# Patient Record
Sex: FEMALE | Race: OTHER | NOT HISPANIC OR LATINO | URBAN - METROPOLITAN AREA
[De-identification: names, ages, dates, MRNs, and addresses within clinical notes are randomized per-mention and may not be internally consistent; named-entity substitution may affect disease eponyms.]

---

## 2020-01-25 ENCOUNTER — EMERGENCY (EMERGENCY)
Facility: HOSPITAL | Age: 40
LOS: 1 days | Discharge: HOME | End: 2020-01-25
Admitting: EMERGENCY MEDICINE
Payer: COMMERCIAL

## 2020-01-25 VITALS
HEART RATE: 100 BPM | SYSTOLIC BLOOD PRESSURE: 142 MMHG | RESPIRATION RATE: 18 BRPM | OXYGEN SATURATION: 96 % | TEMPERATURE: 98 F | DIASTOLIC BLOOD PRESSURE: 82 MMHG | HEIGHT: 62 IN | WEIGHT: 160.06 LBS

## 2020-01-25 PROCEDURE — 99283 EMERGENCY DEPT VISIT LOW MDM: CPT

## 2020-01-25 RX ORDER — DIPHENHYDRAMINE HCL 50 MG
25 CAPSULE ORAL ONCE
Refills: 0 | Status: COMPLETED | OUTPATIENT
Start: 2020-01-25 | End: 2020-01-25

## 2020-01-25 RX ORDER — DIPHENHYDRAMINE HCL 50 MG
1 CAPSULE ORAL
Qty: 15 | Refills: 0
Start: 2020-01-25 | End: 2020-01-29

## 2020-01-25 RX ADMIN — Medication 50 MILLIGRAM(S): at 03:41

## 2020-01-25 RX ADMIN — Medication 25 MILLIGRAM(S): at 03:40

## 2020-01-25 NOTE — ED PROVIDER NOTE - OBJECTIVE STATEMENT
38 yo female in the ER c/o itchy rash on her lower legs, arms, back, shoulders. Pt reports that she noted it at first 2 days ago. Now seems like rash is spreading and she feels itchy even all over her face, also c/o scratchy sensation in her throat. Denies difficulty swallowing, denies SOB, cold or flu symptoms, recent travel, new meds  or new food intake.

## 2020-01-25 NOTE — ED ADULT TRIAGE NOTE - CHIEF COMPLAINT QUOTE
rashes started on her lower extremities, then upper extremities and face with itchiness for couple of days,   sore throat this morning

## 2020-01-25 NOTE — ED PROVIDER NOTE - CHPI ED SYMPTOMS NEG
no difficulty breathing/no difficulty swallowing/no vomiting/no shortness of breath/no swelling of face, tongue

## 2020-01-25 NOTE — ED PROVIDER NOTE - PATIENT PORTAL LINK FT
You can access the FollowMyHealth Patient Portal offered by [facility] by registering at the following website: [email or web address]. By joining [employer]’s FollowMyHealth portal, you will also be able to view your health information using other applications (apps) compatible with our system.

## 2020-01-25 NOTE — ED PROVIDER NOTE - SKIN, MLM
Skin normal color for race, warm, dry and intact.  diffuse urticarial rash on lower and upper extremities. no facial rash

## 2020-01-25 NOTE — ED PROVIDER NOTE - NSFOLLOWUPINSTRUCTIONS_ED_ALL_ED_FT
I have discussed the discharge plan with the patient. The patient agrees with the plan, as discussed.  The patient understands Emergency Department diagnosis is a preliminary diagnosis often based on limited information and that the patient must adhere to the follow-up plan as discussed.  The patient understands that if the symptoms worsen or if prescribed medications do not have the desired/planned effect that the patient may return to the Emergency Department at any time for further evaluation and treatment.    URTICARIA - General Information           Urticaria    WHAT YOU NEED TO KNOW:    What is urticaria? Urticaria is also called hives. Hives can change size and shape, and appear anywhere on your skin. They can be mild or severe and last from a few minutes to a few days. Hives may be a sign of a severe allergic reaction called anaphylaxis that needs immediate treatment. Urticaria that lasts longer than 6 weeks may be a chronic condition that needs long-term treatment.     What causes urticaria? Hives are caused by an immune system reaction. The following are common triggers:   •Food allergies, such as to nuts, eggs, or shellfish      •Food dyes, additives, or preservatives      •Medicine allergies, such as to ibuprofen or antibiotics      •Infections, such as a cold or mono      •Bug bites      •Pets, plants, or latex      •Stress      How is the cause of urticaria diagnosed? Your healthcare provider will examine you and ask about your symptoms. He may also ask about your family medical history, medicines you take, and foods you eat. Tell your healthcare provider about any recent trauma, stress, or contact with allergens. You may need additional testing if you developed anaphylaxis after you were exposed to a trigger and then exercised. This is called exercise-induced anaphylaxis. You may need any of the following:   •A skin test is used to see how your skin reacts to possible triggers. Your healthcare provider will put a small amount of the trigger onto your skin. He will cover the area with a patch that stays on for 2 days. Then he will check your skin for a reaction.      •A challenge test is used to give you increasing doses of what may be causing your hives. Your healthcare provider will watch for a reaction.      How is urticaria treated? Hives often go away without treatment. Chronic urticaria may need to be treated with more than one medicine, or other medicines than listed below. The following are common medicines used to treat urticaria:   •Antihistamines decrease mild symptoms such as itching or a rash.      •Steroids decrease redness, pain, and swelling.      •Epinephrine is used to treat severe allergic reactions such as anaphylaxis.       What steps do I need to take for signs or symptoms of anaphylaxis?   •Immediately give 1 shot of epinephrine only into the outer thigh muscle.       •Leave the shot in place as directed. Your healthcare provider may recommend you leave it in place for up to 10 seconds before you remove it. This helps make sure all of the epinephrine is delivered.       •Call 911 and go to the emergency department, even if the shot improved symptoms. Do not drive yourself. Bring the used epinephrine shot with you.       What safety precautions do I need to take if I am at risk for anaphylaxis?   •Keep 2 shots of epinephrine with you at all times. You may need a second shot, because epinephrine only works for about 20 minutes and symptoms may return. Your healthcare provider can show you and family members how to give the shot. Check the expiration date every month and replace it before it expires.      •Create an action plan. Your healthcare provider can help you create a written plan that explains the allergy and an emergency plan to treat a reaction. The plan explains when to give a second epinephrine shot if symptoms return or do not improve after the first. Give copies of the action plan and emergency instructions to family members, work and school staff, and  providers. Show them how to give a shot of epinephrine.      •Be careful when you exercise. If you have had exercise-induced anaphylaxis, do not exercise right after you eat. Stop exercising right away if you start to develop any signs or symptoms of anaphylaxis. You may first feel tired, warm, or have itchy skin. Hives, swelling, and severe breathing problems may develop if you continue to exercise.      •Carry medical alert identification. Wear medical alert jewelry or carry a card that explains the allergy. Ask your healthcare provider where to get these items.   Medical Alert Jewelry           •Keep a record of triggers and symptoms. Record everything you eat, drink, or apply to your skin for 3 weeks. Include stressful events and what you were doing right before your hives started. Bring the record with you to follow-up visits with your healthcare provider.      What can I do to manage urticaria?   •Cool your skin. This may help decrease itching. Apply a cool pack to your hives. Dip a hand towel in cool water, wring it out, and place it on your hives. You may also soak your skin in a cool oatmeal bath.      •Do not rub your hives. This can irritate your skin and cause more hives.      •Wear loose clothing. Tight clothes may irritate your skin and cause more hives.      •Manage stress. Stress may trigger hives, or make them worse. Learn new ways to relax, such as deep breathing.       Call 911 for signs or symptoms of anaphylaxis, such as trouble breathing, swelling in your mouth or throat, or wheezing. You may also have itching, a rash, or feel like you are going to faint.    When should I seek immediate care?   •Your heart is beating faster than it normally does.      •You have cramping or severe pain in your abdomen.      When should I contact my healthcare provider?   •You have a fever.      •Your skin still itches 24 hours after you take your medicine.      •You still have hives after 7 days.      •Your joints are painful and swollen.      •You have questions or concerns about your condition or care.

## 2020-01-25 NOTE — ED PROVIDER NOTE - CLINICAL SUMMARY MEDICAL DECISION MAKING FREE TEXT BOX
38 yo female in the ER c/o itchy rash on her lower legs, arms, back, shoulders. Pt reports that she noted it at first 2 days ago. Now seems like rash is spreading and she feels itchy even all over her face, also c/o scratchy sensation in her throat. Denies difficulty swallowing, denies SOB, cold or flu symptoms, recent travel, new meds  or new food intake. on exam- well appearing female with diffuse urticarial rash, no clinical findings to suspect any infectious etiology. plan : d/c home with prednisone  and Benadryl PO

## 2020-01-25 NOTE — ED ADULT NURSE NOTE - OBJECTIVE STATEMENT
Patient to the Ed with complaint of rash to extremities legs hands and face for  a couple days described as raised, itchy, and sore throat beginning an hour ago described as scratchy, no difficulty breathing.  patient reported that the only new medication taking is OTC collagen which she has taken before with no side effects.

## 2020-01-31 DIAGNOSIS — L50.9 URTICARIA, UNSPECIFIED: ICD-10-CM

## 2020-01-31 DIAGNOSIS — R21 RASH AND OTHER NONSPECIFIC SKIN ERUPTION: ICD-10-CM

## 2021-06-21 ENCOUNTER — EMERGENCY (EMERGENCY)
Facility: HOSPITAL | Age: 41
LOS: 1 days | Discharge: HOME | End: 2021-06-21
Attending: EMERGENCY MEDICINE | Admitting: EMERGENCY MEDICINE
Payer: COMMERCIAL

## 2021-06-21 VITALS
OXYGEN SATURATION: 98 % | RESPIRATION RATE: 18 BRPM | SYSTOLIC BLOOD PRESSURE: 138 MMHG | TEMPERATURE: 98 F | DIASTOLIC BLOOD PRESSURE: 87 MMHG | HEART RATE: 78 BPM

## 2021-06-21 VITALS
HEART RATE: 83 BPM | RESPIRATION RATE: 18 BRPM | HEIGHT: 62 IN | TEMPERATURE: 98 F | DIASTOLIC BLOOD PRESSURE: 74 MMHG | OXYGEN SATURATION: 99 % | WEIGHT: 149.91 LBS | SYSTOLIC BLOOD PRESSURE: 120 MMHG

## 2021-06-21 DIAGNOSIS — R10.13 EPIGASTRIC PAIN: ICD-10-CM

## 2021-06-21 DIAGNOSIS — Z88.5 ALLERGY STATUS TO NARCOTIC AGENT: ICD-10-CM

## 2021-06-21 DIAGNOSIS — Z90.49 ACQUIRED ABSENCE OF OTHER SPECIFIED PARTS OF DIGESTIVE TRACT: Chronic | ICD-10-CM

## 2021-06-21 DIAGNOSIS — K21.9 GASTRO-ESOPHAGEAL REFLUX DISEASE WITHOUT ESOPHAGITIS: ICD-10-CM

## 2021-06-21 DIAGNOSIS — Z90.49 ACQUIRED ABSENCE OF OTHER SPECIFIED PARTS OF DIGESTIVE TRACT: ICD-10-CM

## 2021-06-21 LAB
ALBUMIN SERPL ELPH-MCNC: 4.6 G/DL — SIGNIFICANT CHANGE UP (ref 3.3–5)
ALP SERPL-CCNC: 85 U/L — SIGNIFICANT CHANGE UP (ref 40–120)
ALT FLD-CCNC: 23 U/L — SIGNIFICANT CHANGE UP (ref 10–45)
ANION GAP SERPL CALC-SCNC: 12 MMOL/L — SIGNIFICANT CHANGE UP (ref 5–17)
APPEARANCE UR: CLEAR — SIGNIFICANT CHANGE UP
AST SERPL-CCNC: 25 U/L — SIGNIFICANT CHANGE UP (ref 10–40)
BACTERIA # UR AUTO: PRESENT /HPF
BASOPHILS # BLD AUTO: 0.03 K/UL — SIGNIFICANT CHANGE UP (ref 0–0.2)
BASOPHILS NFR BLD AUTO: 0.3 % — SIGNIFICANT CHANGE UP (ref 0–2)
BILIRUB SERPL-MCNC: 0.2 MG/DL — SIGNIFICANT CHANGE UP (ref 0.2–1.2)
BILIRUB UR-MCNC: NEGATIVE — SIGNIFICANT CHANGE UP
BUN SERPL-MCNC: 10 MG/DL — SIGNIFICANT CHANGE UP (ref 7–23)
CALCIUM SERPL-MCNC: 8.5 MG/DL — SIGNIFICANT CHANGE UP (ref 8.4–10.5)
CHLORIDE SERPL-SCNC: 105 MMOL/L — SIGNIFICANT CHANGE UP (ref 96–108)
CO2 SERPL-SCNC: 24 MMOL/L — SIGNIFICANT CHANGE UP (ref 22–31)
COLOR SPEC: YELLOW — SIGNIFICANT CHANGE UP
CREAT SERPL-MCNC: 0.7 MG/DL — SIGNIFICANT CHANGE UP (ref 0.5–1.3)
DIFF PNL FLD: ABNORMAL
EOSINOPHIL # BLD AUTO: 0.13 K/UL — SIGNIFICANT CHANGE UP (ref 0–0.5)
EOSINOPHIL NFR BLD AUTO: 1.3 % — SIGNIFICANT CHANGE UP (ref 0–6)
EPI CELLS # UR: SIGNIFICANT CHANGE UP /HPF (ref 0–5)
GLUCOSE SERPL-MCNC: 120 MG/DL — HIGH (ref 70–99)
GLUCOSE UR QL: NEGATIVE — SIGNIFICANT CHANGE UP
HCT VFR BLD CALC: 42.2 % — SIGNIFICANT CHANGE UP (ref 34.5–45)
HGB BLD-MCNC: 13.8 G/DL — SIGNIFICANT CHANGE UP (ref 11.5–15.5)
HYALINE CASTS # UR AUTO: SIGNIFICANT CHANGE UP /LPF (ref 0–2)
IMM GRANULOCYTES NFR BLD AUTO: 0.5 % — SIGNIFICANT CHANGE UP (ref 0–1.5)
KETONES UR-MCNC: NEGATIVE — SIGNIFICANT CHANGE UP
LEUKOCYTE ESTERASE UR-ACNC: NEGATIVE — SIGNIFICANT CHANGE UP
LIDOCAIN IGE QN: 27 U/L — SIGNIFICANT CHANGE UP (ref 7–60)
LYMPHOCYTES # BLD AUTO: 1.33 K/UL — SIGNIFICANT CHANGE UP (ref 1–3.3)
LYMPHOCYTES # BLD AUTO: 13.6 % — SIGNIFICANT CHANGE UP (ref 13–44)
MAGNESIUM SERPL-MCNC: 2.3 MG/DL — SIGNIFICANT CHANGE UP (ref 1.6–2.6)
MCHC RBC-ENTMCNC: 28.9 PG — SIGNIFICANT CHANGE UP (ref 27–34)
MCHC RBC-ENTMCNC: 32.7 GM/DL — SIGNIFICANT CHANGE UP (ref 32–36)
MCV RBC AUTO: 88.3 FL — SIGNIFICANT CHANGE UP (ref 80–100)
MONOCYTES # BLD AUTO: 0.68 K/UL — SIGNIFICANT CHANGE UP (ref 0–0.9)
MONOCYTES NFR BLD AUTO: 6.9 % — SIGNIFICANT CHANGE UP (ref 2–14)
NEUTROPHILS # BLD AUTO: 7.59 K/UL — HIGH (ref 1.8–7.4)
NEUTROPHILS NFR BLD AUTO: 77.4 % — HIGH (ref 43–77)
NITRITE UR-MCNC: NEGATIVE — SIGNIFICANT CHANGE UP
NRBC # BLD: 0 /100 WBCS — SIGNIFICANT CHANGE UP (ref 0–0)
PH UR: 6 — SIGNIFICANT CHANGE UP (ref 5–8)
PLATELET # BLD AUTO: 316 K/UL — SIGNIFICANT CHANGE UP (ref 150–400)
POTASSIUM SERPL-MCNC: 3.8 MMOL/L — SIGNIFICANT CHANGE UP (ref 3.5–5.3)
POTASSIUM SERPL-SCNC: 3.8 MMOL/L — SIGNIFICANT CHANGE UP (ref 3.5–5.3)
PROT SERPL-MCNC: 8 G/DL — SIGNIFICANT CHANGE UP (ref 6–8.3)
PROT UR-MCNC: 30 MG/DL
RBC # BLD: 4.78 M/UL — SIGNIFICANT CHANGE UP (ref 3.8–5.2)
RBC # FLD: 13.2 % — SIGNIFICANT CHANGE UP (ref 10.3–14.5)
RBC CASTS # UR COMP ASSIST: ABNORMAL /HPF
SODIUM SERPL-SCNC: 141 MMOL/L — SIGNIFICANT CHANGE UP (ref 135–145)
SP GR SPEC: >=1.03 — SIGNIFICANT CHANGE UP (ref 1–1.03)
UROBILINOGEN FLD QL: 1 E.U./DL — SIGNIFICANT CHANGE UP
WBC # BLD: 9.81 K/UL — SIGNIFICANT CHANGE UP (ref 3.8–10.5)
WBC # FLD AUTO: 9.81 K/UL — SIGNIFICANT CHANGE UP (ref 3.8–10.5)
WBC UR QL: < 5 /HPF — SIGNIFICANT CHANGE UP

## 2021-06-21 PROCEDURE — 96361 HYDRATE IV INFUSION ADD-ON: CPT

## 2021-06-21 PROCEDURE — 83735 ASSAY OF MAGNESIUM: CPT

## 2021-06-21 PROCEDURE — 83690 ASSAY OF LIPASE: CPT

## 2021-06-21 PROCEDURE — 99284 EMERGENCY DEPT VISIT MOD MDM: CPT | Mod: 25

## 2021-06-21 PROCEDURE — 96374 THER/PROPH/DIAG INJ IV PUSH: CPT

## 2021-06-21 PROCEDURE — 81001 URINALYSIS AUTO W/SCOPE: CPT

## 2021-06-21 PROCEDURE — 80053 COMPREHEN METABOLIC PANEL: CPT

## 2021-06-21 PROCEDURE — 76705 ECHO EXAM OF ABDOMEN: CPT

## 2021-06-21 PROCEDURE — 99284 EMERGENCY DEPT VISIT MOD MDM: CPT

## 2021-06-21 PROCEDURE — 85025 COMPLETE CBC W/AUTO DIFF WBC: CPT

## 2021-06-21 PROCEDURE — 36415 COLL VENOUS BLD VENIPUNCTURE: CPT

## 2021-06-21 PROCEDURE — 76705 ECHO EXAM OF ABDOMEN: CPT | Mod: 26

## 2021-06-21 RX ORDER — SODIUM CHLORIDE 9 MG/ML
1000 INJECTION INTRAMUSCULAR; INTRAVENOUS; SUBCUTANEOUS ONCE
Refills: 0 | Status: COMPLETED | OUTPATIENT
Start: 2021-06-21 | End: 2021-06-21

## 2021-06-21 RX ORDER — OMEPRAZOLE 10 MG/1
1 CAPSULE, DELAYED RELEASE ORAL
Qty: 14 | Refills: 0
Start: 2021-06-21 | End: 2021-07-04

## 2021-06-21 RX ORDER — FAMOTIDINE 10 MG/ML
20 INJECTION INTRAVENOUS ONCE
Refills: 0 | Status: COMPLETED | OUTPATIENT
Start: 2021-06-21 | End: 2021-06-21

## 2021-06-21 RX ADMIN — SODIUM CHLORIDE 1000 MILLILITER(S): 9 INJECTION INTRAMUSCULAR; INTRAVENOUS; SUBCUTANEOUS at 12:54

## 2021-06-21 RX ADMIN — Medication 30 MILLILITER(S): at 08:47

## 2021-06-21 RX ADMIN — FAMOTIDINE 20 MILLIGRAM(S): 10 INJECTION INTRAVENOUS at 08:47

## 2021-06-21 RX ADMIN — SODIUM CHLORIDE 1000 MILLILITER(S): 9 INJECTION INTRAMUSCULAR; INTRAVENOUS; SUBCUTANEOUS at 08:47

## 2021-06-21 NOTE — ED ADULT NURSE NOTE - NSIMPLEMENTINTERV_GEN_ALL_ED
Implemented All Universal Safety Interventions:  [place] to call system. Call bell, personal items and telephone within reach. Instruct patient to call for assistance. Room bathroom lighting operational. Non-slip footwear when patient is off stretcher. Physically safe environment: no spills, clutter or unnecessary equipment. Stretcher in lowest position, wheels locked, appropriate side rails in place.

## 2021-06-21 NOTE — ED ADULT TRIAGE NOTE - OTHER COMPLAINTS
pt here for upper abd pain, nausea and diarrhea x3 days, also chills initially that have since resolved, denies nay vomiting or urinary sx, hx of acid reflux, well appearing in triage

## 2021-06-21 NOTE — ED ADULT NURSE NOTE - OBJECTIVE STATEMENT
patient states that on saturday after working out at the gym she started to feel sharp non radiating epigastric pain with nausea. she states that the pain persisted until the evening and then eased up. yesterday afternoon she felt the pain return and kept her up at night. now she complains of a dull ache to the epigastric area. also reports one episode on non bloody diarrhea this morning. has been able to tolerate PO. patient with hx of acid reflux. reports similar pain in the past but never episodes that have lasted this long. denies chest pain, fever, chills, vomiting, dysuria

## 2021-06-21 NOTE — ED PROVIDER NOTE - CLINICAL SUMMARY MEDICAL DECISION MAKING FREE TEXT BOX
epigastric pain ? gastritis vs cholecystitis vs gallstones. ecg no ischemic changes. vss. labs noted. uhcg negative. pain improved with medications. us done and no acute pathology. will d/c home to f/u with gi and pmd. return precautions d/w pt.

## 2021-06-21 NOTE — ED PROVIDER NOTE - NSFOLLOWUPINSTRUCTIONS_ED_ALL_ED_FT
Follow up with your physician and gastroenterology in one week.             Epigastric Pain    WHAT YOU NEED TO KNOW:    Epigastric pain is felt in the middle of the upper abdomen, between the ribs and the bellybutton. The pain may be mild or severe. Pain may spread from or to another part of your body. Epigastric pain may be a sign of a serious health problem that needs to be treated.     DISCHARGE INSTRUCTIONS:    Call 911 for any of the following:   •You have any of the following signs of a heart attack: ?Squeezing, pressure, or pain in your chest      ?You may also have any of the following: ?Discomfort or pain in your back, neck, jaw, stomach, or arm      ?Shortness of breath      ?Nausea or vomiting      ?Lightheadedness or a sudden cold sweat        •You have severe pain that radiates to your jaw or back.      Return to the emergency department if:   •You have severe pain that starts suddenly and quickly gets worse.      •You cannot have a bowel movement and are vomiting.      •You vomit or cough up blood.      •You see blood in your urine or bowel movement.      •You feel drowsy and your breathing is slower than usual.      Contact your healthcare provider if:   •You have a fever or chills.      •You have yellowing of your skin or the whites of your eyes.      •You vomit often or several times in a row.       •You lose weight without trying.      •You have symptoms for longer than 2 weeks.      •You have questions or concerns about your condition or care.      Medicines:   •Medicines may be given to treat pain or stop vomiting. You may also need medicines to reduce or control stomach acid, or treat an infection.      •Take your medicine as directed. Contact your healthcare provider if you think your medicine is not helping or if you have side effects. Tell him of her if you are allergic to any medicine. Keep a list of the medicines, vitamins, and herbs you take. Include the amounts, and when and why you take them. Bring the list or the pill bottles to follow-up visits. Carry your medicine list with you in case of an emergency.      Follow up with your healthcare provider as directed: Write down your questions so you remember to ask them during your visits.     Manage your symptoms:   •Keep a record of your symptoms. Include when the pain starts, how long it lasts, and if it is sharp or dull. Also include any foods you ate or activities you did before the pain started. Keep track of anything that helped the pain.       •Eat a variety of healthy foods. Healthy foods include fruits, vegetables, whole-grain breads, low-fat dairy products, beans, lean meats, and fish. Ask if you need to be on a special diet. Certain foods may cause your pain, such as alcohol or foods that are high in fat. You may need to eat smaller meals and to eat more often than usual.      •Drink liquids as directed. Ask how much liquid to drink each day and which liquids are best for you. Do not have drinks that contain alcohol or caffeine.         © Copyright [employer] 2021           back to top                          © Copyright [employer] 2021

## 2021-06-21 NOTE — ED PROVIDER NOTE - OBJECTIVE STATEMENT
39 y/o female with no sig PMHx c/o epigastric pain x 3 days. pt states dull achy pain to epigastric region worse with eating. pt notes hx of gerd in past but has not tried taking any medication for sx's. no fever or chills. no n/v/d. no ha or dizziness. no back apin. no urinary sx's. no cp or sob. no further complaints.

## 2021-06-21 NOTE — ED PROVIDER NOTE - ATTENDING CONTRIBUTION TO CARE
Attending Statement: I have personally performed a face to face diagnostic evaluation on this patient. I have reviewed the ACP note and agree with the history, exam and plan of care, except as noted.     Attending Contribution to Care:  39 y/o female with no sig PMHx c/o epigastric pain x 3 days. Labs and RUQ US unremarkbale. Pt feels better after GI meds. Dx likely gastritis/gerd. Recommend outpt GI/PMD follow up, return precautions discussed. Stable for DC.

## 2021-06-21 NOTE — ED PROVIDER NOTE - PATIENT PORTAL LINK FT
You can access the FollowMyHealth Patient Portal offered by [facility] by registering at the following website: [email or web address]. By joining "[employer]’s FollowMyHealth portal, you will also be able to view your health information using other applications (apps) compatible with our system.

## 2021-06-21 NOTE — ED PROVIDER NOTE - CARE PROVIDER_API CALL
Los Song)  [facility] [place]  [address], [address]  [place] [address]  Phone: [phone]  Fax: [phone]  Follow Up Time: 7-10 Days

## 2022-01-02 ENCOUNTER — EMERGENCY (EMERGENCY)
Facility: HOSPITAL | Age: 42
LOS: 1 days | Discharge: HOME | End: 2022-01-02
Admitting: EMERGENCY MEDICINE
Payer: COMMERCIAL

## 2022-01-02 VITALS
DIASTOLIC BLOOD PRESSURE: 79 MMHG | OXYGEN SATURATION: 99 % | RESPIRATION RATE: 18 BRPM | SYSTOLIC BLOOD PRESSURE: 131 MMHG | HEART RATE: 75 BPM | TEMPERATURE: 98 F

## 2022-01-02 VITALS
SYSTOLIC BLOOD PRESSURE: 128 MMHG | HEIGHT: 62 IN | HEART RATE: 77 BPM | OXYGEN SATURATION: 97 % | TEMPERATURE: 98 F | WEIGHT: 175.93 LBS | DIASTOLIC BLOOD PRESSURE: 87 MMHG | RESPIRATION RATE: 18 BRPM

## 2022-01-02 DIAGNOSIS — Z90.49 ACQUIRED ABSENCE OF OTHER SPECIFIED PARTS OF DIGESTIVE TRACT: Chronic | ICD-10-CM

## 2022-01-02 DIAGNOSIS — M70.31 OTHER BURSITIS OF ELBOW, RIGHT ELBOW: ICD-10-CM

## 2022-01-02 DIAGNOSIS — M25.521 PAIN IN RIGHT ELBOW: ICD-10-CM

## 2022-01-02 DIAGNOSIS — Z88.5 ALLERGY STATUS TO NARCOTIC AGENT: ICD-10-CM

## 2022-01-02 PROBLEM — K21.9 GASTRO-ESOPHAGEAL REFLUX DISEASE WITHOUT ESOPHAGITIS: Chronic | Status: ACTIVE | Noted: 2021-06-21

## 2022-01-02 LAB
ALBUMIN SERPL ELPH-MCNC: 4.6 G/DL — SIGNIFICANT CHANGE UP (ref 3.3–5)
ALP SERPL-CCNC: 84 U/L — SIGNIFICANT CHANGE UP (ref 40–120)
ALT FLD-CCNC: SIGNIFICANT CHANGE UP U/L (ref 10–45)
ANION GAP SERPL CALC-SCNC: 10 MMOL/L — SIGNIFICANT CHANGE UP (ref 5–17)
AST SERPL-CCNC: SIGNIFICANT CHANGE UP U/L (ref 10–40)
BASOPHILS # BLD AUTO: 0.05 K/UL — SIGNIFICANT CHANGE UP (ref 0–0.2)
BASOPHILS NFR BLD AUTO: 0.5 % — SIGNIFICANT CHANGE UP (ref 0–2)
BILIRUB SERPL-MCNC: 0.4 MG/DL — SIGNIFICANT CHANGE UP (ref 0.2–1.2)
BUN SERPL-MCNC: 14 MG/DL — SIGNIFICANT CHANGE UP (ref 7–23)
CALCIUM SERPL-MCNC: 9.1 MG/DL — SIGNIFICANT CHANGE UP (ref 8.4–10.5)
CHLORIDE SERPL-SCNC: 106 MMOL/L — SIGNIFICANT CHANGE UP (ref 96–108)
CO2 SERPL-SCNC: 24 MMOL/L — SIGNIFICANT CHANGE UP (ref 22–31)
CREAT SERPL-MCNC: 0.57 MG/DL — SIGNIFICANT CHANGE UP (ref 0.5–1.3)
CRP SERPL-MCNC: 27.9 MG/L — HIGH (ref 0–4)
EOSINOPHIL # BLD AUTO: 0.21 K/UL — SIGNIFICANT CHANGE UP (ref 0–0.5)
EOSINOPHIL NFR BLD AUTO: 2 % — SIGNIFICANT CHANGE UP (ref 0–6)
ERYTHROCYTE [SEDIMENTATION RATE] IN BLOOD: 32 MM/HR — HIGH
GLUCOSE SERPL-MCNC: 90 MG/DL — SIGNIFICANT CHANGE UP (ref 70–99)
HCT VFR BLD CALC: 40 % — SIGNIFICANT CHANGE UP (ref 34.5–45)
HGB BLD-MCNC: 13.3 G/DL — SIGNIFICANT CHANGE UP (ref 11.5–15.5)
IMM GRANULOCYTES NFR BLD AUTO: 0.7 % — SIGNIFICANT CHANGE UP (ref 0–1.5)
LYMPHOCYTES # BLD AUTO: 2.45 K/UL — SIGNIFICANT CHANGE UP (ref 1–3.3)
LYMPHOCYTES # BLD AUTO: 23 % — SIGNIFICANT CHANGE UP (ref 13–44)
MCHC RBC-ENTMCNC: 29.2 PG — SIGNIFICANT CHANGE UP (ref 27–34)
MCHC RBC-ENTMCNC: 33.3 GM/DL — SIGNIFICANT CHANGE UP (ref 32–36)
MCV RBC AUTO: 87.7 FL — SIGNIFICANT CHANGE UP (ref 80–100)
MONOCYTES # BLD AUTO: 0.62 K/UL — SIGNIFICANT CHANGE UP (ref 0–0.9)
MONOCYTES NFR BLD AUTO: 5.8 % — SIGNIFICANT CHANGE UP (ref 2–14)
NEUTROPHILS # BLD AUTO: 7.24 K/UL — SIGNIFICANT CHANGE UP (ref 1.8–7.4)
NEUTROPHILS NFR BLD AUTO: 68 % — SIGNIFICANT CHANGE UP (ref 43–77)
NRBC # BLD: 0 /100 WBCS — SIGNIFICANT CHANGE UP (ref 0–0)
PLATELET # BLD AUTO: 351 K/UL — SIGNIFICANT CHANGE UP (ref 150–400)
POTASSIUM SERPL-MCNC: SIGNIFICANT CHANGE UP MMOL/L (ref 3.5–5.3)
POTASSIUM SERPL-SCNC: SIGNIFICANT CHANGE UP MMOL/L (ref 3.5–5.3)
PROT SERPL-MCNC: 8 G/DL — SIGNIFICANT CHANGE UP (ref 6–8.3)
RBC # BLD: 4.56 M/UL — SIGNIFICANT CHANGE UP (ref 3.8–5.2)
RBC # FLD: 12.3 % — SIGNIFICANT CHANGE UP (ref 10.3–14.5)
SODIUM SERPL-SCNC: 140 MMOL/L — SIGNIFICANT CHANGE UP (ref 135–145)
WBC # BLD: 10.64 K/UL — HIGH (ref 3.8–10.5)
WBC # FLD AUTO: 10.64 K/UL — HIGH (ref 3.8–10.5)

## 2022-01-02 PROCEDURE — 85025 COMPLETE CBC W/AUTO DIFF WBC: CPT

## 2022-01-02 PROCEDURE — 85652 RBC SED RATE AUTOMATED: CPT

## 2022-01-02 PROCEDURE — 99284 EMERGENCY DEPT VISIT MOD MDM: CPT | Mod: 25

## 2022-01-02 PROCEDURE — 99283 EMERGENCY DEPT VISIT LOW MDM: CPT | Mod: 25

## 2022-01-02 PROCEDURE — 73080 X-RAY EXAM OF ELBOW: CPT | Mod: 26,RT

## 2022-01-02 PROCEDURE — 73080 X-RAY EXAM OF ELBOW: CPT

## 2022-01-02 PROCEDURE — 80053 COMPREHEN METABOLIC PANEL: CPT

## 2022-01-02 PROCEDURE — 86140 C-REACTIVE PROTEIN: CPT

## 2022-01-02 PROCEDURE — 36415 COLL VENOUS BLD VENIPUNCTURE: CPT

## 2022-01-02 RX ORDER — AZTREONAM 2 G
1 VIAL (EA) INJECTION
Qty: 20 | Refills: 0
Start: 2022-01-02 | End: 2022-01-11

## 2022-01-02 RX ORDER — IBUPROFEN 200 MG
600 TABLET ORAL ONCE
Refills: 0 | Status: COMPLETED | OUTPATIENT
Start: 2022-01-02 | End: 2022-01-02

## 2022-01-02 RX ADMIN — Medication 600 MILLIGRAM(S): at 12:38

## 2022-01-02 NOTE — ED PROVIDER NOTE - OBJECTIVE STATEMENT
42 yo F with no pmh c/o L elbow pain x 4 days. Pt states initially pain was mild but then worsened. Last night the elbow felt hot. Denies fever, chills, numbness, tingling. Denies trauma. Pt having difficulty fully bending the elbow.

## 2022-01-02 NOTE — ED PROVIDER NOTE - PHYSICAL EXAMINATION
CONSTITUTIONAL: Well-appearing;  in no apparent distress.   HEAD: Normocephalic; atraumatic.   EYES: PERRL; EOM intact; conjunctiva and sclera clear  ENT: normal nose; no rhinorrhea; normal pharynx with no erythema or lesions.   NECK: Supple; non-tender;   CARDIOVASCULAR: rrr, no audible murmurs   RESPIRATORY: Breathing easily;   MSK: R elbow- + swelling. +tenderness, mild erythema and warmth over lateral epicondyle, Limited full flexion   EXT: No cyanosis or edema; N/V intact  SKIN: Normal for age and race; warm; dry; good turgor; no apparent lesions or rash.

## 2022-01-02 NOTE — ED PROVIDER NOTE - CARE PROVIDER_API CALL
Miki Benedict)  Orthopaedic Surgery Surgery  [address] [place] [address]  Phone: [phone]  Fax: [phone]  Follow Up Time:

## 2022-01-02 NOTE — CONSULT NOTE ADULT - SUBJECTIVE AND OBJECTIVE BOX
Orthopaedic Surgery Consult Note    Attending Physician: Jak  Consult requested by: ED    CC: R elbow pain    HPI:  40 yo F with no pmh c/o L elbow pain x 4 days. Pt states initially pain was mild but then worsened. Last night the elbow felt hot. Denies fever, chills, numbness, tingling. Denies trauma, hx of gout, or previous dental work. Pt is RHD and works an office job. Pt having difficulty fully bending the elbow.    Allergies    hydrocodone (Rash)  oxycodone (Rash)    Intolerances      PAST MEDICAL & SURGICAL HISTORY:  Acid reflux    History of appendectomy        Meds:      Family History:  Denies family history of bleeding disorders    Social History:   Pt is a nonsmoker  Social EtOH use     Review of Systems:  All ROS are negative except for those mentioned in HPI.    Physical Exam:  General: Pt Alert and oriented, NAD, afebrile  R elbow mildly swollen with a mildly erythematous rash overlying the posterolateral skin. TTP over soft tissues overlying posterolateral elbow. No bony tenderness.  Full passive/active ROM, pain free mid arc motion, with pain felt at rash it during extremes of flexion  Pulses: 2+ radial pulses, fingers wwp, cap refill <3 seconds  Sensation: SILT radial/median/ulnar distributions  Motor: AIN/PIN/ulnar motors,  strength, wrist ext/flex 5/5    Vital Signs Last 24 Hrs  T(C): 36.7 (02 Jan 2022 10:58), Max: 36.7 (02 Jan 2022 10:58)  T(F): 98 (02 Jan 2022 10:58), Max: 98 (02 Jan 2022 10:58)  HR: 77 (02 Jan 2022 10:58) (77 - 77)  BP: 128/87 (02 Jan 2022 10:58) (128/87 - 128/87)  BP(mean): --  RR: 18 (02 Jan 2022 10:58) (18 - 18)  SpO2: 97% (02 Jan 2022 10:58) (97% - 97%)      Labs:                        13.3   10.64 )-----------( 351      ( 02 Jan 2022 13:12 )             40.0     01-02    140  |  106  |  14  ----------------------------<  90  SEE NOTE   |  24  |  0.57    Ca    9.1      02 Jan 2022 13:12    TPro  8.0  /  Alb  4.6  /  TBili  0.4  /  DBili  x   /  AST  SEE NOTE  /  ALT  SEE NOTE  /  AlkPhos  84  01-02    ESR 32, CRP 27.9    Imaging:   R elbow Xray (ap/lateral/oblique): No evidence of arthritis, fracture or joint effusion.    A/P: 41yFemale healthy RHD with R elbow pain, concerning for R elbow septic bursitis, no concern for septic elbow.  - afebrile, wbc WNL  - elevated ESR, CRP  - no acute other surgical intervention at this time  - discharge pt with PO bactrim x7-10 days  - return to ER if experiencing fever/chills, worsening swelling/pain  - f/u with Dr. Benedict in 1-2 weeks  - WBS: WBAT RUE  - Discussed with Attending, Dr. Jak Amor, PGY-1  Ortho Pager [phone]   Orthopaedic Surgery Consult Note    Attending Physician: Jak  Consult requested by: ED    CC: R elbow pain    HPI:  40 yo F with no pmh c/o L elbow pain x 4 days. Pt states initially pain was mild but then worsened. Last night the elbow felt hot. Denies fever, chills, numbness, tingling. Denies trauma, hx of gout, or previous dental work. Pt is RHD and works an office job. Pt having difficulty fully bending the elbow.    Allergies    hydrocodone (Rash)  oxycodone (Rash)    Intolerances      PAST MEDICAL & SURGICAL HISTORY:  Acid reflux    History of appendectomy        Meds:      Family History:  Denies family history of bleeding disorders    Social History:   Pt is a nonsmoker  Social EtOH use     Review of Systems:  All ROS are negative except for those mentioned in HPI.    Physical Exam:  General: Pt Alert and oriented, NAD, afebrile  R elbow mildly swollen with a mildly erythematous rash overlying the posterolateral skin. TTP over soft tissues overlying posterolateral elbow. No bony tenderness.  Full passive/active ROM, pain free mid arc motion, with pain felt at rash it during extremes of flexion  Pulses: 2+ radial pulses, fingers wwp, cap refill <3 seconds  Sensation: SILT radial/median/ulnar distributions  Motor: AIN/PIN/ulnar motors,  strength, wrist ext/flex 5/5    Vital Signs Last 24 Hrs  T(C): 36.7 (02 Jan 2022 10:58), Max: 36.7 (02 Jan 2022 10:58)  T(F): 98 (02 Jan 2022 10:58), Max: 98 (02 Jan 2022 10:58)  HR: 77 (02 Jan 2022 10:58) (77 - 77)  BP: 128/87 (02 Jan 2022 10:58) (128/87 - 128/87)  BP(mean): --  RR: 18 (02 Jan 2022 10:58) (18 - 18)  SpO2: 97% (02 Jan 2022 10:58) (97% - 97%)      Labs:                        13.3   10.64 )-----------( 351      ( 02 Jan 2022 13:12 )             40.0     01-02    140  |  106  |  14  ----------------------------<  90  SEE NOTE   |  24  |  0.57    Ca    9.1      02 Jan 2022 13:12    TPro  8.0  /  Alb  4.6  /  TBili  0.4  /  DBili  x   /  AST  SEE NOTE  /  ALT  SEE NOTE  /  AlkPhos  84  01-02    ESR 32, CRP 27.9    Imaging:   R elbow Xray (ap/lateral/oblique): No evidence of arthritis, fracture or joint effusion.    A/P: 41yFemale healthy RHD with R elbow pain, concerning for R elbow septic bursitis, no concern for septic elbow.  - afebrile, wbc WNL  - elevated ESR, CRP  - no acute other surgical intervention at this time  - discharge pt with PO bactrim x10-14 days  - return to ER if experiencing fever/chills, worsening swelling/pain  - f/u with Dr. Benedict in 1-2 weeks  - WBS: WBAT RUE  - Discussed with Attending, Dr. Jak Amor, PGY-1  Ortho Pager [phone]

## 2022-01-02 NOTE — ED PROVIDER NOTE - PATIENT PORTAL LINK FT
You can access the FollowMyHealth Patient Portal offered by [facility] by registering at the following website: [email or web address]. By joining [employer]’s FollowMyHealth portal, you will also be able to view your health information using other applications (apps) compatible with our system.

## 2022-01-02 NOTE — ED PROVIDER NOTE - CLINICAL SUMMARY MEDICAL DECISION MAKING FREE TEXT BOX
42 yo F with no pmh c/o L elbow pain x 4 days. Pt states initially pain was mild but then worsened. Last night the elbow felt hot. Denies fever, chills, numbness, tingling. Denies trauma. Pt having difficulty fully bending the elbow. R elbow- + swelling. +tenderness, mild erythema and warmth over lateral epicondyle, Limited full flexion 40 yo F with no pmh c/o L elbow pain x 4 days. Pt states initially pain was mild but then worsened. Last night the elbow felt hot. Denies fever, chills, numbness, tingling. Denies trauma. Pt having difficulty fully bending the elbow. R elbow- + swelling. +tenderness, mild erythema and warmth over lateral epicondyle, Limited full flexion. XR neg. Seen by ortho, no concern for septic joint, more likely bursitis vs cellulitis. Pt given bactrim will f/u with Dr. Benedict.

## 2022-01-03 RX ORDER — AZTREONAM 2 G
1 VIAL (EA) INJECTION
Qty: 20 | Refills: 0
Start: 2022-01-03 | End: 2022-01-12

## 2024-09-29 NOTE — ED PROVIDER NOTE - ENMT, MLM
Airway patent, Nasal mucosa clear. Mouth with normal mucosa. Throat has no vesicles, no oropharyngeal exudates and uvula is midline.
Right knee pain
No